# Patient Record
Sex: FEMALE
[De-identification: names, ages, dates, MRNs, and addresses within clinical notes are randomized per-mention and may not be internally consistent; named-entity substitution may affect disease eponyms.]

---

## 2018-01-31 ENCOUNTER — HOSPITAL ENCOUNTER (OUTPATIENT)
Dept: HOSPITAL 92 - BICRAD | Age: 53
Discharge: HOME | End: 2018-01-31
Payer: COMMERCIAL

## 2018-01-31 DIAGNOSIS — R76.11: Primary | ICD-10-CM

## 2018-01-31 PROCEDURE — 71046 X-RAY EXAM CHEST 2 VIEWS: CPT

## 2020-07-20 ENCOUNTER — HOSPITAL ENCOUNTER (OUTPATIENT)
Dept: HOSPITAL 92 - LABBT | Age: 55
Discharge: HOME | End: 2020-07-20
Attending: NEUROLOGICAL SURGERY
Payer: COMMERCIAL

## 2020-07-20 DIAGNOSIS — M51.16: ICD-10-CM

## 2020-07-20 DIAGNOSIS — Z01.818: Primary | ICD-10-CM

## 2020-07-20 DIAGNOSIS — M48.061: ICD-10-CM

## 2020-07-20 DIAGNOSIS — Z11.59: ICD-10-CM

## 2020-07-20 LAB
ANION GAP SERPL CALC-SCNC: 11 MMOL/L (ref 10–20)
APTT PPP: 29.9 SEC (ref 22.9–36.1)
BUN SERPL-MCNC: 10 MG/DL (ref 9.8–20.1)
CALCIUM SERPL-MCNC: 9.3 MG/DL (ref 7.8–10.44)
CHLORIDE SERPL-SCNC: 106 MMOL/L (ref 98–107)
CO2 SERPL-SCNC: 26 MMOL/L (ref 22–29)
CREAT CL PREDICTED SERPL C-G-VRATE: 0 ML/MIN (ref 70–130)
GLUCOSE SERPL-MCNC: 185 MG/DL (ref 70–105)
HGB BLD-MCNC: 13.5 G/DL (ref 12–16)
INR PPP: 0.9
MCH RBC QN AUTO: 28 PG (ref 27–31)
MCV RBC AUTO: 83.2 FL (ref 78–98)
PLATELET # BLD AUTO: 124 THOU/UL (ref 130–400)
POTASSIUM SERPL-SCNC: 3.6 MMOL/L (ref 3.5–5.1)
PROTHROMBIN TIME: 12.6 SEC (ref 12–14.7)
RBC # BLD AUTO: 4.83 MILL/UL (ref 4.2–5.4)
SODIUM SERPL-SCNC: 139 MMOL/L (ref 136–145)
WBC # BLD AUTO: 5.8 THOU/UL (ref 4.8–10.8)

## 2020-07-20 PROCEDURE — 85610 PROTHROMBIN TIME: CPT

## 2020-07-20 PROCEDURE — 85730 THROMBOPLASTIN TIME PARTIAL: CPT

## 2020-07-20 PROCEDURE — 93010 ELECTROCARDIOGRAM REPORT: CPT

## 2020-07-20 PROCEDURE — 85027 COMPLETE CBC AUTOMATED: CPT

## 2020-07-20 PROCEDURE — U0003 INFECTIOUS AGENT DETECTION BY NUCLEIC ACID (DNA OR RNA); SEVERE ACUTE RESPIRATORY SYNDROME CORONAVIRUS 2 (SARS-COV-2) (CORONAVIRUS DISEASE [COVID-19]), AMPLIFIED PROBE TECHNIQUE, MAKING USE OF HIGH THROUGHPUT TECHNOLOGIES AS DESCRIBED BY CMS-2020-01-R: HCPCS

## 2020-07-20 PROCEDURE — 87635 SARS-COV-2 COVID-19 AMP PRB: CPT

## 2020-07-20 PROCEDURE — 80048 BASIC METABOLIC PNL TOTAL CA: CPT

## 2020-07-20 PROCEDURE — 93005 ELECTROCARDIOGRAM TRACING: CPT

## 2020-07-23 ENCOUNTER — HOSPITAL ENCOUNTER (OUTPATIENT)
Dept: HOSPITAL 92 - SDC | Age: 55
LOS: 1 days | Discharge: HOME | End: 2020-07-24
Attending: NEUROLOGICAL SURGERY
Payer: COMMERCIAL

## 2020-07-23 VITALS — BODY MASS INDEX: 28.3 KG/M2

## 2020-07-23 DIAGNOSIS — I10: ICD-10-CM

## 2020-07-23 DIAGNOSIS — E78.5: ICD-10-CM

## 2020-07-23 DIAGNOSIS — M48.02: ICD-10-CM

## 2020-07-23 DIAGNOSIS — M48.061: ICD-10-CM

## 2020-07-23 DIAGNOSIS — Z91.041: ICD-10-CM

## 2020-07-23 DIAGNOSIS — M51.17: Primary | ICD-10-CM

## 2020-07-23 DIAGNOSIS — Z79.899: ICD-10-CM

## 2020-07-23 DIAGNOSIS — M51.16: ICD-10-CM

## 2020-07-23 DIAGNOSIS — M50.10: ICD-10-CM

## 2020-07-23 DIAGNOSIS — G47.30: ICD-10-CM

## 2020-07-23 DIAGNOSIS — Z79.84: ICD-10-CM

## 2020-07-23 PROCEDURE — 36416 COLLJ CAPILLARY BLOOD SPEC: CPT

## 2020-07-23 PROCEDURE — 0SB20ZZ EXCISION OF LUMBAR VERTEBRAL DISC, OPEN APPROACH: ICD-10-PCS | Performed by: NEUROLOGICAL SURGERY

## 2020-07-23 PROCEDURE — 76000 FLUOROSCOPY <1 HR PHYS/QHP: CPT

## 2020-07-23 PROCEDURE — 01NB0ZZ RELEASE LUMBAR NERVE, OPEN APPROACH: ICD-10-PCS | Performed by: NEUROLOGICAL SURGERY

## 2020-07-23 RX ADMIN — CEFAZOLIN SODIUM SCH MLS: 2 SOLUTION INTRAVENOUS at 15:43

## 2020-07-23 RX ADMIN — CEFAZOLIN SODIUM SCH MLS: 2 SOLUTION INTRAVENOUS at 22:12

## 2020-07-23 NOTE — OP
DATE OF PROCEDURE:  07/23/2020



RESIDENT:  Cindy Pedersen PA-C



LOCATION:  OR 11. 



Type 1 wound.



PREPROCEDURE DIAGNOSIS:  Left L5 and left S1 radiculopathy with left L5-S1 disk

extrusion. 



POSTPROCEDURE DIAGNOSIS:  Left L5 and left S1 radiculopathy with left L5-S1 disk

extrusion. 



PROCEDURES:  

1. Left L4-L5, left L5-S1 hemilaminotomy and foraminotomies with diskectomy left

L5-S1. 

2. Use of operative microscope for microdissection.



DESCRIPTION OF PROCEDURE:  After informed consent was obtained from the patient, the

patient was brought to the OR.  Proper patient, pause, and identification were

carried out. She was placed under excellent general endotracheal anesthesia and

positioned prone on the OR table.  All appropriate points were padded.  A small

incision was drawn dorsally from L4-S1.  This region was sterilely cleansed,

prepared, and draped.  Proper patient, pause, and identification were carried out.

The wound was then opened with a combination of sharp, monopolar, and blunt

dissection. Left L4-L5 and left L5-S1 segments were exposed.  Localization film

confirmed area of interest. We then performed a left L4-L5 and left L5-S1

hemilaminotomy and foraminotomies with diskectomy and removal of multiple fragments

of the left L5-S1 segment.  Copious irrigation occurred throughout as did maximizing

hemostasis.  There was no spinal fluid leak.  Microscope was used for

microdissection for the diskectomy.  The wound was then closed in anatomic layers

following sprinkling of vancomycin powder. The patient emerged from anesthesia. 





Job ID:  [id number]

## 2020-07-24 VITALS — SYSTOLIC BLOOD PRESSURE: 114 MMHG | TEMPERATURE: 98.2 F | DIASTOLIC BLOOD PRESSURE: 70 MMHG

## 2020-07-24 RX ADMIN — HYDROCODONE BITARTRATE AND ACETAMINOPHEN PRN TAB: 7.5; 325 TABLET ORAL at 09:02

## 2020-07-24 RX ADMIN — HYDROCODONE BITARTRATE AND ACETAMINOPHEN PRN TAB: 7.5; 325 TABLET ORAL at 14:38

## 2020-07-24 RX ADMIN — HYDROCODONE BITARTRATE AND ACETAMINOPHEN PRN TAB: 7.5; 325 TABLET ORAL at 03:10

## 2020-07-24 NOTE — PRG
DATE OF SERVICE:  07/24/2020



SUBJECTIVE:  Ms. Irwin is postoperative day #1 from lumbar decompression

diskectomy.  She has had resolution in her leg pain.  She has as expected incisional

pain related to the operative site.  She is doing well and has met criteria for

discharge.  We will plan for dismissal home.  Went over interim postoperative

questions. 







Job ID:  [id number]